# Patient Record
Sex: FEMALE | Race: WHITE | NOT HISPANIC OR LATINO
[De-identification: names, ages, dates, MRNs, and addresses within clinical notes are randomized per-mention and may not be internally consistent; named-entity substitution may affect disease eponyms.]

---

## 2020-10-16 DIAGNOSIS — M19.049 PRIMARY OSTEOARTHRITIS, UNSPECIFIED HAND: ICD-10-CM

## 2020-10-16 DIAGNOSIS — R20.2 ANESTHESIA OF SKIN: ICD-10-CM

## 2020-10-16 DIAGNOSIS — K12.1 OTHER FORMS OF STOMATITIS: ICD-10-CM

## 2020-10-16 DIAGNOSIS — R21 RASH AND OTHER NONSPECIFIC SKIN ERUPTION: ICD-10-CM

## 2020-10-16 DIAGNOSIS — Z78.9 OTHER SPECIFIED HEALTH STATUS: ICD-10-CM

## 2020-10-16 DIAGNOSIS — M25.462 EFFUSION, LEFT KNEE: ICD-10-CM

## 2020-10-16 DIAGNOSIS — H04.123 DRY EYE SYNDROME OF BILATERAL LACRIMAL GLANDS: ICD-10-CM

## 2020-10-16 DIAGNOSIS — Z87.898 PERSONAL HISTORY OF OTHER SPECIFIED CONDITIONS: ICD-10-CM

## 2020-10-16 DIAGNOSIS — R20.0 ANESTHESIA OF SKIN: ICD-10-CM

## 2020-10-16 DIAGNOSIS — Z91.09 OTHER ALLERGY STATUS, OTHER THAN TO DRUGS AND BIOLOGICAL SUBSTANCES: ICD-10-CM

## 2020-10-16 PROBLEM — Z00.00 ENCOUNTER FOR PREVENTIVE HEALTH EXAMINATION: Status: ACTIVE | Noted: 2020-10-16

## 2020-10-16 RX ORDER — DICLOFENAC SODIUM 10 MG/G
1 GEL TOPICAL DAILY
Refills: 0 | Status: ACTIVE | COMMUNITY

## 2020-10-16 RX ORDER — HYDROXYCHLOROQUINE SULFATE 200 MG/1
200 TABLET ORAL DAILY
Refills: 0 | Status: ACTIVE | COMMUNITY

## 2020-10-19 ENCOUNTER — LABORATORY RESULT (OUTPATIENT)
Age: 50
End: 2020-10-19

## 2020-10-19 ENCOUNTER — APPOINTMENT (OUTPATIENT)
Dept: HEMATOLOGY ONCOLOGY | Facility: CLINIC | Age: 50
End: 2020-10-19
Payer: COMMERCIAL

## 2020-10-19 VITALS
OXYGEN SATURATION: 98 % | SYSTOLIC BLOOD PRESSURE: 166 MMHG | HEART RATE: 72 BPM | TEMPERATURE: 98.2 F | DIASTOLIC BLOOD PRESSURE: 93 MMHG | WEIGHT: 129.56 LBS | HEIGHT: 64 IN | RESPIRATION RATE: 18 BRPM | BODY MASS INDEX: 22.12 KG/M2

## 2020-10-19 DIAGNOSIS — Z86.39 PERSONAL HISTORY OF OTHER ENDOCRINE, NUTRITIONAL AND METABOLIC DISEASE: ICD-10-CM

## 2020-10-19 DIAGNOSIS — M32.9 SYSTEMIC LUPUS ERYTHEMATOSUS, UNSPECIFIED: ICD-10-CM

## 2020-10-19 DIAGNOSIS — Z87.891 PERSONAL HISTORY OF NICOTINE DEPENDENCE: ICD-10-CM

## 2020-10-19 DIAGNOSIS — Z83.2 FAMILY HISTORY OF DISEASES OF THE BLOOD AND BLOOD-FORMING ORGANS AND CERTAIN DISORDERS INVOLVING THE IMMUNE MECHANISM: ICD-10-CM

## 2020-10-19 DIAGNOSIS — Z80.7 FAMILY HISTORY OF OTHER MALIGNANT NEOPLASMS OF LYMPHOID, HEMATOPOIETIC AND RELATED TISSUES: ICD-10-CM

## 2020-10-19 PROCEDURE — 99072 ADDL SUPL MATRL&STAF TM PHE: CPT

## 2020-10-19 PROCEDURE — 99205 OFFICE O/P NEW HI 60 MIN: CPT | Mod: 25

## 2020-10-19 RX ORDER — ASPIRIN 81 MG
81 TABLET, DELAYED RELEASE (ENTERIC COATED) ORAL
Refills: 0 | Status: ACTIVE | COMMUNITY

## 2020-10-19 NOTE — REVIEW OF SYSTEMS
[Chest Pain] : chest pain [Negative] : Cardiovascular [FreeTextEntry5] : Cold extremities, chest pain improved with not working.

## 2020-10-19 NOTE — ASSESSMENT
[FreeTextEntry1] : This is a very pleasant 50-year-old woman with a standing history of systemic lupus erythematosus who has tested positive for antiphospholipid antibodies, reportedly triple positive.  I am unable illicit a history of venous thromboembolic disease or a history suggestive of such.  She reports no prior history of diagnosed thromboses or pregnancy losses.  Given this she does not meet the criteria for an antiphospholipid syndrome. I would also like to obtain a repeat antiphospholipid panel to look for persistence of these findings.  I am requesting records from her prior hematologist.  Her primary care and endocrinologists have a retired as she requests assistance in setting up with new providers.  My office will give her assistance in doing so.  She will continue on aspirin and is instructed on risk reduction strategies in high risk situations. She will return in 3 weeks for followup and to review the above-mentioned worker.\par \par Thank you for allowing me to participate in this woman's medical care, should you have any questions or concerns please hesitate to call me directly.

## 2020-10-19 NOTE — HISTORY OF PRESENT ILLNESS
[de-identified] : Ms. Mcgowan is a 50 year old female who is referred for hematologic evaluation by Dr. Rojas(Rheum) for positive antiphospholipid antibodies, found on routine blood work for history of SLE and Grave's Disease.   She reports that this finding has been noted in the past but she denies history of pregnancy losses or known thrombosis.  She denies any prior signs or symptoms that could be suggestive of the same.  She is currently on aspirin therapy.  She reports a prior evaluation with a hematologist.  Her family history is significant only for father with history of Non-Hodgkin's Lymphoma and mother with "some kind of blood disorder with WBC or platelets."   [0 - No Distress] : Distress Level: 0

## 2020-10-20 ENCOUNTER — APPOINTMENT (OUTPATIENT)
Dept: INTERNAL MEDICINE | Facility: CLINIC | Age: 50
End: 2020-10-20
Payer: COMMERCIAL

## 2020-10-20 VITALS
BODY MASS INDEX: 22.02 KG/M2 | WEIGHT: 129 LBS | OXYGEN SATURATION: 99 % | HEIGHT: 64 IN | HEART RATE: 71 BPM | SYSTOLIC BLOOD PRESSURE: 140 MMHG | DIASTOLIC BLOOD PRESSURE: 95 MMHG

## 2020-10-20 PROCEDURE — 99203 OFFICE O/P NEW LOW 30 MIN: CPT | Mod: 25

## 2020-10-20 PROCEDURE — 99072 ADDL SUPL MATRL&STAF TM PHE: CPT

## 2020-10-20 NOTE — PHYSICAL EXAM
[No Acute Distress] : no acute distress [Well Developed] : well developed [Well Nourished] : well nourished [Normal Sclera/Conjunctiva] : normal sclera/conjunctiva [Well-Appearing] : well-appearing [PERRL] : pupils equal round and reactive to light [EOMI] : extraocular movements intact [Normal Outer Ear/Nose] : the outer ears and nose were normal in appearance [Normal Oropharynx] : the oropharynx was normal [No Lymphadenopathy] : no lymphadenopathy [No JVD] : no jugular venous distention [Supple] : supple [Thyroid Normal, No Nodules] : the thyroid was normal and there were no nodules present [No Respiratory Distress] : no respiratory distress  [No Accessory Muscle Use] : no accessory muscle use [Clear to Auscultation] : lungs were clear to auscultation bilaterally [Normal Rate] : normal rate  [Normal S1, S2] : normal S1 and S2 [Regular Rhythm] : with a regular rhythm [No Murmur] : no murmur heard [No Carotid Bruits] : no carotid bruits [No Varicosities] : no varicosities [No Abdominal Bruit] : a ~M bruit was not heard ~T in the abdomen [No Edema] : there was no peripheral edema [No Palpable Aorta] : no palpable aorta [Pedal Pulses Present] : the pedal pulses are present [Soft] : abdomen soft [No Extremity Clubbing/Cyanosis] : no extremity clubbing/cyanosis [Non Tender] : non-tender [Non-distended] : non-distended [No HSM] : no HSM [No Masses] : no abdominal mass palpated [Normal Bowel Sounds] : normal bowel sounds [Normal Anterior Cervical Nodes] : no anterior cervical lymphadenopathy [Normal Posterior Cervical Nodes] : no posterior cervical lymphadenopathy [No CVA Tenderness] : no CVA  tenderness [No Spinal Tenderness] : no spinal tenderness [No Joint Swelling] : no joint swelling [Grossly Normal Strength/Tone] : grossly normal strength/tone [No Rash] : no rash [Coordination Grossly Intact] : coordination grossly intact [No Focal Deficits] : no focal deficits [Normal Gait] : normal gait [Normal Affect] : the affect was normal [Normal Insight/Judgement] : insight and judgment were intact

## 2020-10-20 NOTE — HISTORY OF PRESENT ILLNESS
[FreeTextEntry1] : Followup for an abnormal CAT scan of the chest. [de-identified] : This is a 50-year-old female former smoker of less than one pack per day for 20 years. She quit 8 years ago. She has a history of lupus and anti-phospholipid syndrome. She recently underwent a routine chest x-ray followed by a CTA of the chest on July 24 of this year. This reveals mild biapical pleural thickening and mild upper lobe centrilobular emphysema. She denies cough, wheezing or shortness of breath. She is able to go on a walk for up to an hour without shortness of breath. She has arthralgias of her fingers. Past history significant for Graves' disease status post radioactive iodine treatment 2008. Current medications include Synthroid, plaque with no and baby aspirin.

## 2020-10-20 NOTE — ASSESSMENT
[FreeTextEntry1] : Patient with mild central lobular emphysema due to prior smoking history. The patient's functional status is excellent without shortness of breath. I have reassured the patient that there is no significant pulmonary pathology at this time. I would obtain a complete pulmonary function test for completeness sake. However there is no evidence of lupus related lung disease. Patient has been reassured.

## 2020-11-11 ENCOUNTER — APPOINTMENT (OUTPATIENT)
Dept: HEMATOLOGY ONCOLOGY | Facility: CLINIC | Age: 50
End: 2020-11-11
Payer: COMMERCIAL

## 2020-11-11 ENCOUNTER — TRANSCRIPTION ENCOUNTER (OUTPATIENT)
Age: 50
End: 2020-11-11

## 2020-11-11 VITALS
HEIGHT: 64 IN | OXYGEN SATURATION: 99 % | DIASTOLIC BLOOD PRESSURE: 85 MMHG | SYSTOLIC BLOOD PRESSURE: 144 MMHG | HEART RATE: 81 BPM | RESPIRATION RATE: 18 BRPM | BODY MASS INDEX: 22.36 KG/M2 | TEMPERATURE: 98.3 F | WEIGHT: 131 LBS

## 2020-11-11 LAB
25(OH)D3 SERPL-MCNC: 29.8 NG/ML
B2 GLYCOPROT1 IGA SERPL IA-ACNC: 7 SAU
B2 GLYCOPROT1 IGG SER-ACNC: 41.1 SGU
B2 GLYCOPROT1 IGM SER-ACNC: <5 SMU
CARDIOLIPIN AB SER IA-ACNC: POSITIVE
CARDIOLIPIN IGM SER-MCNC: 11.6 MPL
CARDIOLIPIN IGM SER-MCNC: 87.2 GPL
DEPRECATED KAPPA LC FREE/LAMBDA SER: 1.42 RATIO
FERRITIN SERPL-MCNC: 66 NG/ML
FOLATE SERPL-MCNC: >20 NG/ML
IGA SER QL IEP: 158 MG/DL
IGG SER QL IEP: 1521 MG/DL
IGM SER QL IEP: 131 MG/DL
IRON SATN MFR SERPL: 18 %
IRON SERPL-MCNC: 59 UG/DL
KAPPA LC CSF-MCNC: 0.96 MG/DL
KAPPA LC SERPL-MCNC: 1.36 MG/DL
M PROTEIN SPEC IFE-MCNC: NORMAL
METHYLMALONATE SERPL-SCNC: 118 NMOL/L
TIBC SERPL-MCNC: 328 UG/DL
UIBC SERPL-MCNC: 269 UG/DL
VIT B12 SERPL-MCNC: 865 PG/ML

## 2020-11-11 PROCEDURE — 99072 ADDL SUPL MATRL&STAF TM PHE: CPT

## 2020-11-11 PROCEDURE — 99214 OFFICE O/P EST MOD 30 MIN: CPT

## 2020-11-11 NOTE — HISTORY OF PRESENT ILLNESS
[0 - No Distress] : Distress Level: 0 [de-identified] : Ms. Mcgowan is a 50 year old female who is referred for hematologic evaluation by Dr. Rojas(Rhe[facility]) for positive antiphospholipid antibodies, found on routine blood work for history of SLE and Grave's Disease.   She reports that this finding has been noted in the past but she denies history of pregnancy losses or known thrombosis.  She denies any prior signs or symptoms that could be suggestive of the same.  She is currently on aspirin therapy.  She reports a prior evaluation with a hematologist.  Her family history is significant only for father with history of Non-Hodgkin's Lymphoma and mother with "some kind of blood disorder with WBC or platelets."  Here to f/u and review the hematological w/u. [de-identified] : Offers no new complaints at today's visit.

## 2020-11-11 NOTE — ASSESSMENT
[FreeTextEntry1] : This is a very pleasant 50-year-old woman with a standing history of systemic lupus erythematosus who has tested positive for antiphospholipid antibodies, reportedly triple positive.  I am unable illicit a history of venous thromboembolic disease or a history suggestive of such.  She reports no prior history of diagnosed thromboses or pregnancy losses.  Given this she does not meet the criteria for an antiphospholipid syndrome. I obtained a repeat antiphospholipid panel to look for persistence of these findings with positive tests for a LA, B2 glycoprotein and cardiolipin abs.  I am requesting records from her prior hematologist.  She is instructed on taking Vit D3 supplementation.  Her primary care and endocrinologists have a retired as she requests assistance in setting up with new providers.  My office will give her assistance in doing so.  She will continue on aspirin and is instructed on risk reduction strategies in high risk situations. She will return in 3 months for followup with labs.

## 2021-02-24 ENCOUNTER — APPOINTMENT (OUTPATIENT)
Dept: HEMATOLOGY ONCOLOGY | Facility: CLINIC | Age: 51
End: 2021-02-24
Payer: COMMERCIAL

## 2021-02-24 VITALS
DIASTOLIC BLOOD PRESSURE: 90 MMHG | OXYGEN SATURATION: 98 % | BODY MASS INDEX: 22.53 KG/M2 | HEIGHT: 64 IN | TEMPERATURE: 98.1 F | RESPIRATION RATE: 18 BRPM | HEART RATE: 78 BPM | SYSTOLIC BLOOD PRESSURE: 139 MMHG | WEIGHT: 132 LBS

## 2021-02-24 PROCEDURE — 99072 ADDL SUPL MATRL&STAF TM PHE: CPT

## 2021-02-24 PROCEDURE — 99214 OFFICE O/P EST MOD 30 MIN: CPT

## 2021-02-24 NOTE — HISTORY OF PRESENT ILLNESS
[0 - No Distress] : Distress Level: 0 [de-identified] : Ms. Mcgowan is a 50 year old female who is referred for hematologic evaluation by Dr. Rojas(Rhe[facility]) for positive antiphospholipid antibodies, found on routine blood work for history of SLE and Grave's Disease.   She reports that this finding has been noted in the past but she denies history of pregnancy losses or known thrombosis.  She denies any prior signs or symptoms that could be suggestive of the same.  She is currently on aspirin therapy.  She reports a prior evaluation with a hematologist.  Her family history is significant only for father with history of Non-Hodgkin's Lymphoma and mother with "some kind of blood disorder with WBC or platelets."  Here to f/u and review the hematological w/u. [de-identified] : Offers no new complaints at today's visit.

## 2021-06-17 ENCOUNTER — APPOINTMENT (OUTPATIENT)
Dept: HEMATOLOGY ONCOLOGY | Facility: CLINIC | Age: 51
End: 2021-06-17
Payer: COMMERCIAL

## 2021-06-17 VITALS
SYSTOLIC BLOOD PRESSURE: 133 MMHG | WEIGHT: 128 LBS | HEART RATE: 79 BPM | TEMPERATURE: 98.2 F | BODY MASS INDEX: 21.85 KG/M2 | DIASTOLIC BLOOD PRESSURE: 84 MMHG | RESPIRATION RATE: 18 BRPM | OXYGEN SATURATION: 98 % | HEIGHT: 64 IN

## 2021-06-17 PROCEDURE — 99214 OFFICE O/P EST MOD 30 MIN: CPT

## 2021-06-17 PROCEDURE — 99072 ADDL SUPL MATRL&STAF TM PHE: CPT

## 2021-06-17 RX ORDER — CHOLECALCIFEROL (VITAMIN D3) 25 MCG
25 MCG TABLET ORAL
Refills: 0 | Status: ACTIVE | COMMUNITY
Start: 2021-06-17

## 2021-06-17 NOTE — ASSESSMENT
[FreeTextEntry1] : This is a very pleasant 50-year-old woman with a standing history of systemic lupus erythematosus who has tested positive for antiphospholipid antibodies, reportedly triple positive.  I am unable illicit a history of venous thromboembolic disease or a history suggestive of such.  She reports no prior history of diagnosed thromboses or pregnancy losses.  Given this she does not meet the criteria for an antiphospholipid syndrome. I obtained a repeat antiphospholipid panel to look for persistence of these findings with positive tests for a LA, B2 glycoprotein and cardiolipin abs.  I am requesting records from her prior hematologist.  She is instructed on taking Vit D3 supplementation.   She will continue on aspirin and is instructed on risk reduction strategies in high risk situations.  There is also some retrospective data to suggest the use of Plaquenil may reduce the risk of venous thromboembolic disease in such patients.  \par Denies any incidence of blood clotting, DVT or PE.  She continues to follow-up with rheumatology for SLE and is taking Plaquenil and baby aspirin.  She has occasionally occasional petechiae/blood blisters on her soft palate and is complaining of some discomfort in her left lateral thigh.  She is advised to follow-up with her PCP or rheumatology. \par Continue routine, age-appropriate, healthcare maintenance \par History of present illness, review of systems, physical exam and treatment plan reviewed with .\par Office visit in 4 months or prn for new or worsening symptoms.

## 2021-06-17 NOTE — HISTORY OF PRESENT ILLNESS
[0 - No Distress] : Distress Level: 0 [de-identified] : Ms. Mcgowan is a 50 year old female who is referred for hematologic evaluation by Dr. Rojas([facility]) for positive antiphospholipid antibodies, found on routine blood work for history of SLE and Grave's Disease.   She reports that this finding has been noted in the past but she denies history of pregnancy losses or known thrombosis.  She denies any prior signs or symptoms that could be suggestive of the same.  She is currently on aspirin therapy.  She reports a prior evaluation with a hematologist.  Her family history is significant only for father with history of Non-Hodgkin's Lymphoma and mother with "some kind of blood disorder with WBC or platelets."  Here to f/u and review the hematological w/u. [de-identified] : Presents for follow-up of antiphospholipid syndrome without history of clot.  She reports feeling well except for some discomfort on her outer thigh where she sees a defect in the muscle.  She also has occasional petechiae on her soft palate but denies bleeding, bruising, leg swelling/tenderness/ redness cough or shortness of breath. She is followed by rheumatology and is on Plaquenil for SLE as well as baby aspirin.

## 2021-06-17 NOTE — REVIEW OF SYSTEMS
[Negative] : Allergic/Immunologic [Muscle Pain] : muscle pain [FreeTextEntry9] : Left outer thigh [de-identified] : Occasional petechiae soft palate

## 2021-07-13 ENCOUNTER — APPOINTMENT (OUTPATIENT)
Dept: PULMONOLOGY | Facility: CLINIC | Age: 51
End: 2021-07-13
Payer: COMMERCIAL

## 2021-07-13 VITALS
DIASTOLIC BLOOD PRESSURE: 80 MMHG | HEART RATE: 85 BPM | BODY MASS INDEX: 21.68 KG/M2 | WEIGHT: 127 LBS | SYSTOLIC BLOOD PRESSURE: 130 MMHG | TEMPERATURE: 96.7 F | HEIGHT: 64 IN | OXYGEN SATURATION: 99 %

## 2021-07-13 DIAGNOSIS — J43.2 CENTRILOBULAR EMPHYSEMA: ICD-10-CM

## 2021-07-13 PROCEDURE — 99072 ADDL SUPL MATRL&STAF TM PHE: CPT

## 2021-07-13 PROCEDURE — 99213 OFFICE O/P EST LOW 20 MIN: CPT

## 2021-07-13 NOTE — HISTORY OF PRESENT ILLNESS
[FreeTextEntry1] : Evaluation after pulmonary function testing. [de-identified] : Patient has been feeling generally well. Her breathing is fine without any difficulty. She had a CAT scan in July of 2021 which revealed mild centrilobular emphysema. Pulmonary function tests were reviewed his within normal limits. Patient formally smoked less than one pack per day for 20 years. She quit about 9 years ago.

## 2021-07-13 NOTE — ASSESSMENT
[FreeTextEntry1] : Patient with normal pulmonary function. The patient has been reassured. She is advised to get a repeat low dose CAT scan of the chest in July of 2022. She will contact me in one year.

## 2021-09-20 LAB
APTT 2H P 1:4 NP PPP: 44.9 SEC
APTT 2H P INC PPP: 45.1 SEC
APTT 50/50 MIX COMMENT: NORMAL
APTT IMM NP/PRE NP PPP: 43 SEC
APTT INV RATIO PPP: 61.4 SEC
NPP NORMAL POOLED PLASMA: 32.3 SECS
SILICA CLOTTING TIME INTERPRETATION: ABNORMAL
SILICA CLOTTING TIME S/C: 1.97 RATIO

## 2021-11-02 ENCOUNTER — APPOINTMENT (OUTPATIENT)
Dept: HEMATOLOGY ONCOLOGY | Facility: CLINIC | Age: 51
End: 2021-11-02
Payer: COMMERCIAL

## 2021-11-02 VITALS
HEIGHT: 64 IN | TEMPERATURE: 98.5 F | WEIGHT: 126 LBS | HEART RATE: 84 BPM | DIASTOLIC BLOOD PRESSURE: 90 MMHG | SYSTOLIC BLOOD PRESSURE: 150 MMHG | OXYGEN SATURATION: 98 % | RESPIRATION RATE: 18 BRPM | BODY MASS INDEX: 21.51 KG/M2

## 2021-11-02 PROCEDURE — 99214 OFFICE O/P EST MOD 30 MIN: CPT

## 2021-11-02 RX ORDER — FAMOTIDINE 20 MG/1
20 TABLET, FILM COATED ORAL TWICE DAILY
Refills: 0 | Status: DISCONTINUED | COMMUNITY
End: 2021-10-01

## 2021-11-02 RX ORDER — LEVOTHYROXINE SODIUM 0.1 MG/1
100 TABLET ORAL
Refills: 0 | Status: ACTIVE | COMMUNITY

## 2021-11-02 RX ORDER — ROSUVASTATIN CALCIUM 10 MG/1
10 TABLET, FILM COATED ORAL
Refills: 0 | Status: ACTIVE | COMMUNITY

## 2021-11-02 NOTE — PHYSICAL EXAM
[Restricted in physically strenuous activity but ambulatory and able to carry out work of a light or sedentary nature] : Status 1- Restricted in physically strenuous activity but ambulatory and able to carry out work of a light or sedentary nature, e.g., light house work, office work [Normal] : affect appropriate [de-identified] : Bandage rash/wound inferior/anterior to the right knee.

## 2021-11-02 NOTE — REVIEW OF SYSTEMS
[Muscle Pain] : muscle pain [Negative] : Allergic/Immunologic [FreeTextEntry9] : Left outer thigh [de-identified] : Please see interval history.

## 2021-11-02 NOTE — HISTORY OF PRESENT ILLNESS
[0 - No Distress] : Distress Level: 0 [de-identified] : Ms. Mcgowan is a 50 year old female who is referred for hematologic evaluation by Dr. Rojas(Rhe[facility]) for positive antiphospholipid antibodies, found on routine blood work for history of SLE and Grave's Disease.   She reports that this finding has been noted in the past but she denies history of pregnancy losses or known thrombosis.  She denies any prior signs or symptoms that could be suggestive of the same.  She is currently on aspirin therapy.  She reports a prior evaluation with a hematologist.  Her family history is significant only for father with history of Non-Hodgkin's Lymphoma and mother with "some kind of blood disorder with WBC or platelets."  Here to f/u and review the hematological w/u. [de-identified] : Presents for follow-up of antiphospholipid syndrome without history of clot.  She reports feeling well except for some discomfort on her outer thigh where she sees a defect in the muscle.  She also has occasional petechiae on her soft palate but denies bleeding, bruising, leg swelling/tenderness/ redness cough or shortness of breath. She is followed by rheumatology and is on Plaquenil for SLE as well as baby aspirin.   These are currently on hold due to a reportedly bull's-eye rash she developed on her right knee that was biopsied approximately 1 week ago.  Pathology results are still pending.  She has been placed on a Medrol Dosepak, doxycycline, and valacyclovir.  She reports that the rash is improving at this time.

## 2021-11-02 NOTE — ASSESSMENT
[FreeTextEntry1] : This is a very pleasant 51-year-old woman with a standing history of systemic lupus erythematosus who has tested positive for antiphospholipid antibodies, reportedly triple positive.  I am unable illicit a history of venous thromboembolic disease or a history suggestive of such.  She reports no prior history of diagnosed thromboses or pregnancy losses.  Given this she does not meet the criteria for an antiphospholipid syndrome. I obtained a repeat antiphospholipid panel to look for persistence of these findings with positive tests for a LA, B2 glycoprotein and cardiolipin abs.  I am requesting records from her prior hematologist.  She is instructed on taking Vit D3 supplementation.   She will continue on aspirin and is instructed on risk reduction strategies in high risk situations.  There is also some retrospective data to suggest the use of Plaquenil may reduce the risk of venous thromboembolic disease in such patients.  \par Denies any incidence of blood clotting, DVT or PE.  She continues to follow-up with rheumatology for SLE and has been taking Plaquenil and baby aspirin.  This has been stopped due to the development of a new bull's-eye rash inferior to her right knee.  This was not biopsied by dermatology approximately 1 week ago with the dermatopathology pending.  I have asked her to forward a copy of the results to me when available.  I have also asked her to discuss with her rheumatologist when to resume aspirin therapy once she completes her acute therapies including Medrol Dosepak, valacyclovir, and doxycycline.  She is advised to follow-up with her PCP or rheumatology. \par Continue routine, age-appropriate, healthcare maintenance \par Office visit in 4 months or prn for new or worsening symptoms.

## 2021-11-15 LAB
25(OH)D3 SERPL-MCNC: 28.9 NG/ML
25(OH)D3 SERPL-MCNC: 39.1 NG/ML
APTT 2H P 1:4 NP PPP: 36.1 SEC
APTT 2H P 1:4 NP PPP: 51.4 SEC
APTT 2H P INC PPP: 37.8 SEC
APTT 2H P INC PPP: 50.5 SEC
APTT 50/50 MIX COMMENT: NORMAL
APTT 50/50 MIX COMMENT: NORMAL
APTT BLD: 47 SEC
APTT IMM NP/PRE NP PPP: 37.9 SEC
APTT IMM NP/PRE NP PPP: 45.2 SEC
APTT INV RATIO PPP: 47 SEC
APTT INV RATIO PPP: 67.9 SEC
B2 GLYCOPROT1 IGA SERPL IA-ACNC: <5 SAU
B2 GLYCOPROT1 IGA SERPL IA-ACNC: <5 SAU
B2 GLYCOPROT1 IGG SER-ACNC: 29.8 SGU
B2 GLYCOPROT1 IGG SER-ACNC: 35.5 SGU
B2 GLYCOPROT1 IGG SER-ACNC: 39.6 SGU
B2 GLYCOPROT1 IGM SER-ACNC: <5 SMU
B2 GLYCOPROT1 IGM SER-ACNC: <5 SMU
B2 MICROGLOB SERPL-MCNC: 1.7 MG/L
CARDIOLIPIN AB SER IA-ACNC: POSITIVE
CARDIOLIPIN IGM SER-MCNC: 6.2 MPL
CARDIOLIPIN IGM SER-MCNC: 68.6 GPL
CARDIOLIPIN IGM SER-MCNC: 7.8 MPL
CARDIOLIPIN IGM SER-MCNC: 74.9 GPL
CARDIOLIPIN IGM SER-MCNC: 86 GPL
CONFIRM: 31.4 SEC
CONFIRM: 31.7 SEC
CONFIRM: 33.1 SEC
DRVVT IMM 1:2 NP PPP: ABNORMAL
DRVVT SCREEN TO CONFIRM RATIO: 1.49 RATIO
DRVVT SCREEN TO CONFIRM RATIO: 1.54 RATIO
DRVVT SCREEN TO CONFIRM RATIO: 1.76 RATIO
NPP NORMAL POOLED PLASMA: 32.5 SECS
NPP NORMAL POOLED PLASMA: 34.9 SECS
SCREEN DRVVT: 64.7 SEC
SCREEN DRVVT: 66.5 SEC
SCREEN DRVVT: 74.7 SEC
SILICA CLOTTING TIME INTERPRETATION: ABNORMAL
SILICA CLOTTING TIME INTERPRETATION: ABNORMAL
SILICA CLOTTING TIME S/C: 1.86 RATIO
SILICA CLOTTING TIME S/C: 2.53 RATIO

## 2022-03-09 ENCOUNTER — APPOINTMENT (OUTPATIENT)
Dept: HEMATOLOGY ONCOLOGY | Facility: CLINIC | Age: 52
End: 2022-03-09
Payer: COMMERCIAL

## 2022-03-09 VITALS
HEIGHT: 64 IN | OXYGEN SATURATION: 99 % | DIASTOLIC BLOOD PRESSURE: 85 MMHG | BODY MASS INDEX: 22.36 KG/M2 | SYSTOLIC BLOOD PRESSURE: 148 MMHG | TEMPERATURE: 97.6 F | WEIGHT: 131 LBS | RESPIRATION RATE: 18 BRPM | HEART RATE: 75 BPM

## 2022-03-09 DIAGNOSIS — E05.00 THYROTOXICOSIS WITH DIFFUSE GOITER W/OUT THYROTOXIC CRISIS OR STORM: ICD-10-CM

## 2022-03-09 DIAGNOSIS — E03.9 HYPOTHYROIDISM, UNSPECIFIED: ICD-10-CM

## 2022-03-09 DIAGNOSIS — E55.9 VITAMIN D DEFICIENCY, UNSPECIFIED: ICD-10-CM

## 2022-03-09 PROCEDURE — 99214 OFFICE O/P EST MOD 30 MIN: CPT

## 2022-03-09 NOTE — PHYSICAL EXAM
[Restricted in physically strenuous activity but ambulatory and able to carry out work of a light or sedentary nature] : Status 1- Restricted in physically strenuous activity but ambulatory and able to carry out work of a light or sedentary nature, e.g., light house work, office work [Normal] : affect appropriate [de-identified] : Bandage rash/wound inferior/anterior to the right knee.

## 2022-03-09 NOTE — HISTORY OF PRESENT ILLNESS
[0 - No Distress] : Distress Level: 0 [de-identified] : Ms. Mcgowan is a 51 year old female who is referred for hematologic evaluation by Dr. Rojas(Rhe[facility]) for positive antiphospholipid antibodies, found on routine blood work for history of SLE and Grave's Disease.   She reports that this finding has been noted in the past but she denies history of pregnancy losses or known thrombosis.  She denies any prior signs or symptoms that could be suggestive of the same.  She is currently on aspirin therapy.  She reports a prior evaluation with a hematologist.  Her family history is significant only for father with history of Non-Hodgkin's Lymphoma and mother with "some kind of blood disorder with WBC or platelets."  Here to f/u and review the hematological w/u. [de-identified] : Presents for follow-up of antiphospholipid positivity without history of clot.  She reports feeling well except for some discomfort on her outer thigh where she sees a defect in the muscle.  She also has occasional petechiae on her soft palate but denies bleeding, bruising, leg swelling/tenderness/ redness cough or shortness of breath. She is followed by rheumatology and is on Plaquenil for SLE as well as baby aspirin.

## 2022-03-09 NOTE — ASSESSMENT
[FreeTextEntry1] : This is a very pleasant 51-year-old woman with a standing history of systemic lupus erythematosus who has tested positive for antiphospholipid antibodies, reportedly triple positive.  I am unable illicit a history of venous thromboembolic disease or a history suggestive of such.  She reports no prior history of diagnosed thromboses or pregnancy losses.  Given this she does not meet the criteria for an antiphospholipid syndrome. I obtained a repeat antiphospholipid panel to look for persistence of these findings with positive tests for a LA, B2 glycoprotein and cardiolipin abs.  I am requesting records from her prior hematologist.  She is instructed on taking Vit D3 supplementation.   She will continue on aspirin and is instructed on risk reduction strategies in high risk situations.  There is also some retrospective data to suggest the use of Plaquenil may reduce the risk of venous thromboembolic disease in such patients.  \par Denies any incidence of blood clotting, DVT or PE.  She continues to follow-up with rheumatology for SLE and has been taking Plaquenil and baby aspirin.  This has been restarted since the prior visit as it was on hold then for a rash above her knee.  Biopsies revealed this to be most consistent with a tick bite.\par Office visit in 4 months or prn for new or worsening symptoms.   She is educated on the signs and symptoms of venous thromboembolic disease and knows to call with any concerns.

## 2022-03-09 NOTE — REVIEW OF SYSTEMS
[Muscle Pain] : muscle pain [Negative] : Allergic/Immunologic [FreeTextEntry9] : Left outer thigh [de-identified] : Please see interval history.

## 2022-10-10 DIAGNOSIS — M32.9 SYSTEMIC LUPUS ERYTHEMATOSUS, UNSPECIFIED: ICD-10-CM

## 2022-10-11 ENCOUNTER — APPOINTMENT (OUTPATIENT)
Dept: HEMATOLOGY ONCOLOGY | Facility: CLINIC | Age: 52
End: 2022-10-11

## 2022-10-11 VITALS
SYSTOLIC BLOOD PRESSURE: 139 MMHG | WEIGHT: 131 LBS | RESPIRATION RATE: 18 BRPM | BODY MASS INDEX: 22.36 KG/M2 | OXYGEN SATURATION: 99 % | HEIGHT: 64 IN | DIASTOLIC BLOOD PRESSURE: 84 MMHG | TEMPERATURE: 98 F | HEART RATE: 80 BPM

## 2022-10-11 DIAGNOSIS — R76.0 RAISED ANTIBODY TITER: ICD-10-CM

## 2022-10-11 DIAGNOSIS — R79.82 ELEVATED C-REACTIVE PROTEIN (CRP): ICD-10-CM

## 2022-10-11 DIAGNOSIS — R70.0 ELEVATED ERYTHROCYTE SEDIMENTATION RATE: ICD-10-CM

## 2022-10-11 PROCEDURE — 99213 OFFICE O/P EST LOW 20 MIN: CPT | Mod: 25

## 2022-10-11 PROCEDURE — 36415 COLL VENOUS BLD VENIPUNCTURE: CPT

## 2022-10-11 RX ORDER — METHYLPREDNISOLONE 4 MG/1
4 TABLET ORAL
Refills: 0 | Status: COMPLETED | COMMUNITY
End: 2022-10-11

## 2022-10-11 RX ORDER — PREDNISONE 10 MG
TABLET ORAL
Refills: 0 | Status: ACTIVE | COMMUNITY

## 2022-10-11 RX ORDER — DOXYCYCLINE HYCLATE 100 MG/1
100 TABLET ORAL
Refills: 0 | Status: COMPLETED | COMMUNITY
End: 2022-10-11

## 2022-10-11 RX ORDER — MELOXICAM 15 MG/1
15 TABLET ORAL
Refills: 0 | Status: COMPLETED | COMMUNITY
End: 2022-10-11

## 2022-10-11 RX ORDER — VALACYCLOVIR HYDROCHLORIDE 1 G/1
1 TABLET, FILM COATED ORAL
Refills: 0 | Status: COMPLETED | COMMUNITY
End: 2022-10-11

## 2022-10-12 ENCOUNTER — NON-APPOINTMENT (OUTPATIENT)
Age: 52
End: 2022-10-12

## 2022-10-17 PROBLEM — R76.0 POSITIVE CARDIOLIPIN ANTIBODIES: Status: ACTIVE | Noted: 2020-11-11

## 2022-10-17 NOTE — REVIEW OF SYSTEMS
[Muscle Pain] : muscle pain [Negative] : Integumentary [FreeTextEntry9] : Left outer thigh [de-identified] : Please see interval history.

## 2022-10-17 NOTE — PHYSICAL EXAM
[Restricted in physically strenuous activity but ambulatory and able to carry out work of a light or sedentary nature] : Status 1- Restricted in physically strenuous activity but ambulatory and able to carry out work of a light or sedentary nature, e.g., light house work, office work [Normal] : affect appropriate [de-identified] : Bandage rash/wound inferior/anterior to the right knee.

## 2022-10-17 NOTE — HISTORY OF PRESENT ILLNESS
[0 - No Distress] : Distress Level: 0 [de-identified] : Ms. Mcgowan is a 51 year old female who is referred for hematologic evaluation by Dr. Rojas(Rhe[facility]) for positive antiphospholipid antibodies, found on routine blood work for history of SLE and Grave's Disease.   She reports that this finding has been noted in the past but she denies history of pregnancy losses or known thrombosis.  She denies any prior signs or symptoms that could be suggestive of the same.  She is currently on aspirin therapy.  She reports a prior evaluation with a hematologist.  Her family history is significant only for father with history of Non-Hodgkin's Lymphoma and mother with "some kind of blood disorder with WBC or platelets."  Here to f/u and review the hematological w/u. [de-identified] : Presents for follow-up of antiphospholipid positivity without history of clot.  She reports feeling well except for some discomfort on her outer thigh where she sees a defect in the muscle.  She also has occasional petechiae on her soft palate but denies bleeding, bruising, leg swelling/tenderness/ redness cough or shortness of breath. She is followed by rheumatology and is on Plaquenil for SLE as well as baby aspirin.

## 2022-10-17 NOTE — ASSESSMENT
[FreeTextEntry1] :  51-year-old woman with a long standing history of systemic lupus erythematosus who has tested positive for antiphospholipid antibodies, reportedly triple positive.\par No h/o VTE/pregnancy losses\par   She will continue on aspirin and is instructed on risk reduction strategies in high risk situations.  There is also some retrospective data to suggest the use of Plaquenil may reduce the risk of venous thromboembolic disease in such patients.  \par

## 2023-01-31 NOTE — REASON FOR VISIT
[Follow-Up Visit] : a follow-up visit for [FreeTextEntry2] : Antiphospholipid Syndrome Finasteride Counseling:  I discussed with the patient the risks of use of finasteride including but not limited to decreased libido, decreased ejaculate volume, gynecomastia, and depression. Women should not handle medication.  All of the patient's questions and concerns were addressed. Finasteride Male Counseling: Finasteride Counseling:  I discussed with the patient the risks of use of finasteride including but not limited to decreased libido, decreased ejaculate volume, gynecomastia, and depression. Women should not handle medication.  All of the patient's questions and concerns were addressed.

## 2023-03-05 LAB
CONFIRM: 31.9 SEC
DRVVT IMM 1:2 NP PPP: ABNORMAL
DRVVT SCREEN TO CONFIRM RATIO: 1.68 RATIO
SCREEN DRVVT: 64.2 SEC

## 2023-04-11 ENCOUNTER — APPOINTMENT (OUTPATIENT)
Dept: HEMATOLOGY ONCOLOGY | Facility: CLINIC | Age: 53
End: 2023-04-11
Payer: COMMERCIAL

## 2023-04-11 VITALS
OXYGEN SATURATION: 99 % | WEIGHT: 130 LBS | HEART RATE: 78 BPM | SYSTOLIC BLOOD PRESSURE: 151 MMHG | RESPIRATION RATE: 18 BRPM | TEMPERATURE: 98.4 F | BODY MASS INDEX: 22.2 KG/M2 | DIASTOLIC BLOOD PRESSURE: 86 MMHG | HEIGHT: 64 IN

## 2023-04-11 DIAGNOSIS — R76.0 RAISED ANTIBODY TITER: ICD-10-CM

## 2023-04-11 PROCEDURE — 36415 COLL VENOUS BLD VENIPUNCTURE: CPT

## 2023-04-11 PROCEDURE — 99213 OFFICE O/P EST LOW 20 MIN: CPT | Mod: 25

## 2023-04-11 NOTE — ASSESSMENT
[FreeTextEntry1] :  51-year-old woman with a long standing history of systemic lupus erythematosus who has tested positive for antiphospholipid antibodies, reportedly triple positive.\par No h/o VTE/pregnancy losses\par On aspirin and plaquenil\par \par   She will continue on aspirin and is instructed on risk reduction strategies in high risk situations.  There is also some retrospective data to suggest the use of Plaquenil may reduce the risk of venous thromboembolic disease in such patients.  \par Father had NHL and lung cancer. PAternal grandfather had cancer\par \par Plan:\par \par continue aa/laquenil\par  health maintenance-- mammogram 10/22\par papsmear --10/22\par due for colonoscopy\par To get supplements next visit\par \par Labs ordered, drawn in the office and reviewed.\par Next visit will order CBC with diff, CMP,\par \par \par RTC  6 months No

## 2023-04-11 NOTE — HISTORY OF PRESENT ILLNESS
[0 - No Distress] : Distress Level: 0 [de-identified] : Ms. Mcgowan is a 51 year old female who is referred for hematologic evaluation by Dr. Rojas(Rhe[facility]) for positive antiphospholipid antibodies, found on routine blood work for history of SLE and Grave's Disease.   She reports that this finding has been noted in the past but she denies history of pregnancy losses or known thrombosis.  She denies any prior signs or symptoms that could be suggestive of the same.  She is currently on aspirin therapy.  She reports a prior evaluation with a hematologist.  \par \par Her family history is significant only for father with history of Non-Hodgkin's Lymphoma and mother with "some kind of blood disorder with WBC or platelets."  Here to f/u and review the hematological w/u. [de-identified] : Presents for follow-up of antiphospholipid positivity without history of clot.

## 2023-04-11 NOTE — REVIEW OF SYSTEMS
[Muscle Pain] : muscle pain [Negative] : Allergic/Immunologic [FreeTextEntry9] : Left outer thigh [de-identified] : Please see interval history. no

## 2023-04-11 NOTE — PHYSICAL EXAM
[Restricted in physically strenuous activity but ambulatory and able to carry out work of a light or sedentary nature] : Status 1- Restricted in physically strenuous activity but ambulatory and able to carry out work of a light or sedentary nature, e.g., light house work, office work [Normal] : affect appropriate [de-identified] : Bandage rash/wound inferior/anterior to the right knee.

## 2023-10-12 ENCOUNTER — APPOINTMENT (OUTPATIENT)
Dept: HEMATOLOGY ONCOLOGY | Facility: CLINIC | Age: 53
End: 2023-10-12